# Patient Record
Sex: FEMALE | Race: WHITE
[De-identification: names, ages, dates, MRNs, and addresses within clinical notes are randomized per-mention and may not be internally consistent; named-entity substitution may affect disease eponyms.]

---

## 2018-10-19 ENCOUNTER — HOSPITAL ENCOUNTER (OUTPATIENT)
Dept: HOSPITAL 80 - CIMAGING | Age: 48
End: 2018-10-19
Attending: NURSE PRACTITIONER
Payer: COMMERCIAL

## 2018-10-19 DIAGNOSIS — R59.0: ICD-10-CM

## 2018-10-19 DIAGNOSIS — E04.2: Primary | ICD-10-CM

## 2019-01-01 ENCOUNTER — HOSPITAL ENCOUNTER (INPATIENT)
Dept: HOSPITAL 80 - FED | Age: 49
LOS: 2 days | Discharge: HOME | DRG: 343 | End: 2019-01-03
Attending: SURGERY | Admitting: SURGERY
Payer: SELF-PAY

## 2019-01-01 DIAGNOSIS — K35.20: Primary | ICD-10-CM

## 2019-01-01 DIAGNOSIS — E03.9: ICD-10-CM

## 2019-01-01 LAB — PLATELET # BLD: 266 10^3/UL (ref 150–400)

## 2019-01-01 PROCEDURE — G0378 HOSPITAL OBSERVATION PER HR: HCPCS

## 2019-01-01 NOTE — GHP
DATE OF ADMISSION:  01/01/2019



CHIEF COMPLAINT:  Acute appendicitis.



HISTORY OF PRESENT ILLNESS:  The patient is a 48-year-old woman who 1 week ago started having left up
per quadrant pain.  She thought she was having gas pain.  It would come and go.  She was at a yoga re
treat and again had the pain in the left upper quadrant; however, it migrated down to her pelvis area
, so she originally thought that she was having some issues with her reproductive system.  She then h
ad pain by her rectum.  When the pain woke her up with more of a right lower quadrant pain, she asked
 a nurse at the yoga retreat, and then she was brought to [facility].  CT scan shows inflamed appendix w
ith possible fecalith outside of the appendix.  She denies fevers.  She denies vomiting.  She does en
dorse anorexia.



PAST MEDICAL HISTORY:  Hypothyroidism.



PAST SURGICAL HISTORY:  Tubal ligation.



SOCIAL HISTORY:  She works as a psychotherapist.  She likes to do yoga and run.  She is a nonsmoker.



FAMILY HISTORY:  Noncontributory.



REVIEW OF SYSTEMS:  Per HPI.



PHYSICAL EXAMINATION:  VITAL SIGNS:  37, 82, 128/76, 18, 97% room air.  GENERAL:  Pleasant, well-groo
med, well-nourished woman sitting on gurney.  Appears nontoxic.  HEENT:  Normocephalic.  No gross hea
ring deficits.  Mucous membranes moist.  Pupils equal and round.  No scleral icterus.  LUNGS:  Clear 
to auscultation bilaterally.  No increased work of breathing.  CARDIAC:  Regular rate.  No peripheral
 edema.  ABDOMEN:  Bowel sounds are present.  She is tender to palpation in the left upper quadrant. 
 She has very minimal pain in the right lower quadrant or near her pelvis.  MUSCULOSKELETAL:  Normal 
nails.  NEURO:  Grossly intact.  PSYCH:  Mood and affect normal.



RESULTS REVIEWED:  Personally reviewed the results of her laboratory work, and she has ketones in her
 urine.  Her chemistry panel is within normal limits.  Beta HCG negative.  Her white blood cell count
 is 10.61.  I reviewed the results of her CT scan and see the concern of the extraluminal appendicoli
th.  There is also some lucent area in the uterus as well.



IMPRESSION AND PLAN:  The patient is a 48-year-old woman with possible perforated appendicitis.  Svetlana
rkable that her white count is not more elevated and that she does not have a lot of pain in the righ
t lower quadrant.  I will take her to the operating room for a laparoscopic appendectomy with possibl
e washout.  I discussed that if it is not perforated, she may be able to go home today; however, if i
t is perforated, she will require admission with additional IV antibiotics.  Risks and benefits were 
discussed.  She signed the informed consent.  Her reaction to penicillin as a child was that she turn
ed red.  We discussed the possible cross reactivity with ceftriaxone, and we will proceed with giving
 it while watching her.





Job #:  [id number]

## 2019-01-01 NOTE — EDPHY
HPI/HX/ROS/PE/MDM


Narrative: 


CHIEF COMPLAINT: Abdominal pain





HPI: 


The patient is a 49 y/o female with a history of a tubal ligation arriving via 

EMS complaining of a sharp, midline abdominal pain, onset at 01:30, 6 hours 

ago. Prior to developing the pain she had some gas pain. However, she woke up 

from her sleep with the sharp, diffuse abdominal pain. This pain feels similar 

to the pain she had while giving birth. While en route to the hospital she was 

given 150mcg IV Fentanyl which improved her pain from 10/10 to 4/10. She has 

had a decreased appetite associated with the pain. She denies urinary symptoms 

but states that the last time she urinated it was more concentrated and had an 

odd smell. No fever, headache, chest pain, shortness of breath, bowel complaints

, numbness, paresthesias. 





REVIEW OF SYSTEMS:


Aside from elements discussed in the HPI, a comprehensive 10-point review of 

systems was reviewed and is negative.





PMH: Tubal ligation, Hypothyroid





SOCIAL HISTORY: Lives in [place], employed as a sex therapist, 





PHYSICAL EXAM:


General: Patient is alert, in no acute distress.


ENT: Eyes are normal to inspection.  ENT inspection normal.


Neck: Normal inspection.  Full range of motion.


Respiratory: No respiratory distress.  Breath sounds normal bilaterally.


Cardiovascular: Regular rate and rhythm.  Strong peripheral pulses.  Normal cap 

refill.


Abdomen: Moderate suprapubic tenderness to palpation. There are no peritoneal 

signs. There are normal bowel sounds.


Back: Normal to inspection.  No tenderness to palpation.


Skin: Normal color.  No rash.  Warm and dry.


Extremities: Normal appearance.  Full range of motion.


Neuro: Oriented x3.  Normal motor function.  Normal sensory function.





ED Course: 





0710: I met EMS upon arrival.





0828: Reassessed patient and discussed laboratory findings. I have also 

discussed plan for abdominopelvic CT, which she is comfortable with. She is 

still in pain, 15mg IV Toradol and 1L IV NS administered.





0915: I spoke with Dr. Angulo, radiologist, who reports the patient has acute 

appendicitis. General surgery will be paged.





0920: Reassessed patient and discussed imaging findings. I have also discussed 

plan for surgery, which she is comfortable with.





0925: I consulted with Dr. Prince, general surgeon, who agrees to consult and 

admit this patient.








- Data Points


Imaging Results: 


 Imaging Impressions





Abdomen CT  01/01/19 08:29


Impression:


1. Appendicitis, possibly perforated, with regional inflammation. A small, 5 mm

, calculus (possibly appendicolith) resides inferiorly medial to the appendix. 

Trace fluid. No abscess.


2. Enlarged uterus containing numerous intramural leiomyomas.


 


Findings discussed with Emergency Department physician, Jose Xiao MD  

at  1/1/2019 9:20.


 


 











Imaging: Discussed imaging studies w/ On call Radiologist, I viewed and 

interpreted images myself


Laboratory Results: 


 Laboratory Results





 01/01/19 07:18 





 01/01/19 07:18 





 











  01/01/19 01/01/19 01/01/19





  07:58 07:18 07:18


 


WBC      





    


 


RBC      





    


 


Hgb      





    


 


Hct      





    


 


MCV      





    


 


MCH      





    


 


MCHC      





    


 


RDW      





    


 


Plt Count      





    


 


MPV      





    


 


Neut % (Auto)      





    


 


Lymph % (Auto)      





    


 


Mono % (Auto)      





    


 


Eos % (Auto)      





    


 


Baso % (Auto)      





    


 


Nucleat RBC Rel Count      





    


 


Absolute Neuts (auto)      





    


 


Absolute Lymphs (auto)      





    


 


Absolute Monos (auto)      





    


 


Absolute Eos (auto)      





    


 


Absolute Basos (auto)      





    


 


Absolute Nucleated RBC      





    


 


Immature Gran %      





    


 


Immature Gran #      





    


 


Sodium      136 mEq/L mEq/L





     (135-145) 


 


Potassium      3.9 mEq/L mEq/L





     (3.5-5.2) 


 


Chloride      101 mEq/L mEq/L





     () 


 


Carbon Dioxide      21 mEq/l L mEq/l





     (22-31) 


 


Anion Gap      14 mEq/L mEq/L





     (6-14) 


 


BUN      8 mg/dL mg/dL





     (7-23) 


 


Creatinine      0.7 mg/dL mg/dL





     (0.6-1.0) 


 


Estimated GFR      > 60 





    


 


Glucose      124 mg/dL H mg/dL





     () 


 


Calcium      9.5 mg/dL mg/dL





     (8.5-10.4) 


 


Beta HCG, Qual    NEGATIVE   





    


 


Urine Color  URVASHI     





    


 


Urine Appearance  MODERATELY TURBID     





    


 


Urine pH  5.0     





   (5.0-7.5)   


 


Ur Specific Gravity  1.025     





   (1.002-1.030)   


 


Urine Protein  NEGATIVE     





   (NEGATIVE)   


 


Urine Ketones  2+  H     





   (NEGATIVE)   


 


Urine Blood  NEGATIVE     





   (NEGATIVE)   


 


Urine Nitrate  NEGATIVE     





   (NEGATIVE)   


 


Urine Bilirubin  NEGATIVE     





   (NEGATIVE)   


 


Urine Urobilinogen  2.0 EU H EU    





   (0.2-1.0)   


 


Ur Leukocyte Esterase  NEGATIVE     





   (NEGATIVE)   


 


Urine Glucose  NEGATIVE     





   (NEGATIVE)   














  01/01/19





  07:18


 


WBC  10.61 10^3/uL H 10^3/uL





   (3.80-9.50) 


 


RBC  4.53 10^6/uL 10^6/uL





   (4.18-5.33) 


 


Hgb  13.3 g/dL g/dL





   (12.6-16.3) 


 


Hct  39.6 % %





   (38.0-47.0) 


 


MCV  87.4 fL fL





   (81.5-99.8) 


 


MCH  29.4 pg pg





   (27.9-34.1) 


 


MCHC  33.6 g/dL g/dL





   (32.4-36.7) 


 


RDW  13.3 % %





   (11.5-15.2) 


 


Plt Count  266 10^3/uL 10^3/uL





   (150-400) 


 


MPV  10.5 fL fL





   (8.7-11.7) 


 


Neut % (Auto)  85.8 % H %





   (39.3-74.2) 


 


Lymph % (Auto)  8.4 % L %





   (15.0-45.0) 


 


Mono % (Auto)  4.5 % %





   (4.5-13.0) 


 


Eos % (Auto)  0.2 % L %





   (0.6-7.6) 


 


Baso % (Auto)  0.6 % %





   (0.3-1.7) 


 


Nucleat RBC Rel Count  0.0 % %





   (0.0-0.2) 


 


Absolute Neuts (auto)  9.11 10^3/uL H 10^3/uL





   (1.70-6.50) 


 


Absolute Lymphs (auto)  0.89 10^3/uL L 10^3/uL





   (1.00-3.00) 


 


Absolute Monos (auto)  0.48 10^3/uL 10^3/uL





   (0.30-0.80) 


 


Absolute Eos (auto)  0.02 10^3/uL L 10^3/uL





   (0.03-0.40) 


 


Absolute Basos (auto)  0.06 10^3/uL 10^3/uL





   (0.02-0.10) 


 


Absolute Nucleated RBC  0.00 10^3/uL 10^3/uL





   (0-0.01) 


 


Immature Gran %  0.5 % %





   (0.0-1.1) 


 


Immature Gran #  0.05 10^3/uL 10^3/uL





   (0.00-0.10) 


 


Sodium  





  


 


Potassium  





  


 


Chloride  





  


 


Carbon Dioxide  





  


 


Anion Gap  





  


 


BUN  





  


 


Creatinine  





  


 


Estimated GFR  





  


 


Glucose  





  


 


Calcium  





  


 


Beta HCG, Qual  





  


 


Urine Color  





  


 


Urine Appearance  





  


 


Urine pH  





  


 


Ur Specific Gravity  





  


 


Urine Protein  





  


 


Urine Ketones  





  


 


Urine Blood  





  


 


Urine Nitrate  





  


 


Urine Bilirubin  





  


 


Urine Urobilinogen  





  


 


Ur Leukocyte Esterase  





  


 


Urine Glucose  





  











Medications Given: 


 





Potassium Chloride/Dextrose/Sod Cl (D5w 1/2 Ns W/ 20 Kcl/L)  1,000 mls @ 75 mls/

hr IV CONT DENISE


   Stop: 06/30/19 09:44


   Last Admin: 01/01/19 11:31 Dose:  1,000 mls





Discontinued Medications





Sodium Chloride (Ns)  1,000 mls @ 0 mls/hr IV EDNOW ONE; Wide Open


   PRN Reason: Protocol


   Stop: 01/01/19 07:10


   Last Admin: 01/01/19 07:17 Dose:  1,000 mls


Ceftriaxone Sodium 2 gm/ (Sodium Chloride)  50 mls @ 100 mls/hr IV EDNOW ONE


   PRN Reason: Protocol


   Stop: 01/01/19 10:13


   Last Admin: 01/01/19 12:06 Dose:  50 mls


Ketorolac Tromethamine (Toradol)  15 mg IVP EDNOW ONE


   Stop: 01/01/19 08:37


   Last Admin: 01/01/19 08:39 Dose:  15 mg








General


Time Seen by Provider: 01/01/19 07:09


Initial Vital Signs: 


 Initial Vital Signs











Temperature (C)  37.0 C   01/01/19 07:10


 


Heart Rate  82   01/01/19 07:10


 


Respiratory Rate  18   01/01/19 07:10


 


Blood Pressure  128/76 H  01/01/19 07:10


 


O2 Sat (%)  97   01/01/19 07:10








 











O2 Delivery Mode               Room Air














Allergies/Adverse Reactions: 


 





Penicillins Allergy (Mild, Verified 01/01/19 11:57)


 Other-Enter Comments








Home Medications: 














 Medication  Instructions  Recorded


 


Thyroid [Kismet Thyroid 60 MG (*)] 30 mg PO DAILY 01/01/19














Departure





- Departure


Disposition: [facility]


Clinical Impression: 


Acute appendicitis


Qualifiers:


 Acute appendicitis type: unspecified acute appendicitis type Qualified Code(s)

: K35.80 - Unspecified acute appendicitis





Condition: Fair


Report Scribed for: Jose Xiao


Report Scribed by: Ban Sarah


Date of Report: 01/01/19


Time of Report: 07:10


Physician Review and Approval Statement: Portions of this note were transcribed 

by an ED scribe.  I personally performed the history, physical exam, and 

medical decision making; and confirm the accuracy of the information in the 

transcribed note.

## 2019-01-01 NOTE — GOP
DATE OF OPERATION:  01/01/2019



SURGEON:  Jazmine Prince MD



ANESTHESIA:  General.



ANESTHESIOLOGIST:  Virgilio Pinedo MD.



PREOPERATIVE DIAGNOSIS:  Perforated appendicitis.



POSTOPERATIVE DIAGNOSIS:  Perforated appendicitis.



PROCEDURE PERFORMED:  Laparoscopic appendectomy with washout and drain 
placement.



FINDINGS:  perforated appendix with fluid in gutter and pelvis



SPECIMENS:  Appendix and fluid for microbiology.



ESTIMATED BLOOD LOSS:  5 cc.



INDICATIONS:  The patient is a 48-year-old woman who presented with left upper 
quadrant pain, pelvic pain and right lower quadrant pain.  CT scan showed 
possible ruptured appendicitis.



DESCRIPTION OF PROCEDURE:  Patient was brought into the operating room, placed 
supine on the table.  General anesthesia was administered.  Her abdomen was 
prepped and draped in the usual sterile fashion.  I infiltrated all sites with 
0.5% Marcaine prior to making incisions.  I made the incision at her umbilicus.
  I elevated it.  I inserted the Veress needle and passed the hanging drop 
test.  Her abdomen insufflated easily to a pressure of 15 mmHg.  I placed a 5 
mm trocar with a camera at this site.  There were no injuries from Veress 
needle placement.  Under direct vision, I placed a 5 mm suprapubic trocar and a 
10 mm trocar in the left lower quadrant.  I explored her abdomen.  There was 
purulent fluid along the right paracolic gutter and in the pelvis.  She had a 
generous-sized uterus with fibroids on it.  I performed suction and submitted a 
sample for microbiology.  Next, I dissected the appendix away from the cecum.  
It was in a retrocecal position.  There was an obvious rupture point.  I 
transected the mesoappendix with the Harmonic Scalpel.  I divided the base with 
an Endo TAYLOR 45 white load.  The appendix was placed in an EndoCatch bag and 
retrieved via the 10 mm trocar.  Suction irrigation was performed.  I placed a 
15 round silicone drain via the pubic port into her pelvis and up along the 
right paracolic gutter.  It was sutured in place with 3-0 nylon.  All ports 
removed under direct vision, the abdomen allowed to desufflate.  The fascia at 
the 10 mm trocar site was closed with 0 Vicryl, skin closed with 4-0 Monocryl.  
Dermabond applied.  Drain sponge applied.  She was awakened in the operating 
room, extubated, transferred to PACU in stable condition.





Job #:  [id number]

MTDD

## 2019-01-01 NOTE — ASMTCMCOM
CM Note

 

CM Note                       

Notes:

Reviewed chart. Pt presented to the Emergency Department with left and right upper quadrant 

pain. History includes hypothyroidism and a tubal ligation. 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 

[place]; she works as a Psychotherapist. Pt admitted for acute appendicitis. Discharge needs 

remain unclear at this time. Anticipate pt will likely discharge home independently when medically 

stable. CM will continue to follow.



Discharge Plan: Likely independent, CM to follow for any potential needs

 

Date Signed:  01/01/2019 12:47 PM

Electronically Signed By:Preethi Wells RN

## 2019-01-01 NOTE — PDANEPAE
ANE History of Present Illness





toro FRANCISCO Past Medical History





- Cardiovascular History


Hx Hypertension: No


Hx Arrhythmias: No


Hx Chest Pain: No


Hx Coronary Artery / Peripheral Vascular Disease: No


Hx CHF / Valvular Disease: No


Hx Palpitations: No





- Pulmonary History


Hx COPD: No


Hx Asthma/Reactive Airway Disease: No


Hx Recent Upper Respiratory Infection: No


Hx Oxygen in Use at Home: No


Hx Sleep Apnea: No


Sleep Apnea Screening Result - Last Documented: Negative





- Endocrine History


Hx Diabetes: No


Obesity: no





- Chronic Pain History


Chronic Pain: No





ANE Review of Systems


Review of systems is: negative


Review of Systems: 








- Exercise capacity


Exercise capacity: >=4 METS





ANE Patient History





- Allergies


Allergies/Adverse Reactions: 








Penicillins Allergy (Mild, Verified 01/01/19 11:57)


 Other-Enter Comments








- Home Medications


Home Medications: 








Thyroid [Colrain Thyroid 60 MG (*)] 30 mg PO DAILY 01/01/19 [Last Taken 12/31/18]








- NPO status


NPO Since - Liquids (Date): 12/31/18


NPO Since - Liquids (Time): 22:00


NPO Since - Solids (Date): 12/31/18


NPO Since - Solids (Time): 22:00





- Smoking Hx


Smoking Status: Never smoked





ANE Labs/Vital Signs





- Labs


Result Diagrams: 


 01/01/19 07:18





 01/01/19 07:18





- Vital Signs


Blood Pressure: 115/64


Heart Rate: 83


Respiratory Rate: 16


O2 Sat (%): 96


Height: 165.1 cm


Weight: 68 kg





ANE Physical Exam





- Airway


Neck exam: FROM


Mallampati Score: Class 1


Mouth exam: normal dental/mouth exam





- Pulmonary


Pulmonary: no respiratory distress





- Cardiovascular


Cardiovascular: regular rate and rhythym





- ASA Status


ASA Status: II, E





ANE Anesthesia Plan


Anesthesia Plan: general endotracheal anesthesia

## 2019-01-01 NOTE — POSTOPPROG
Post Op Note


Date of Operation: 01/01/19


Surgeon: Jazmine Prince


Anesthesiologist: noah


Anesthesia: GET(General Endotracheal)


Pre-op Diagnosis: perf appy


Post-op Diagnosis: perf appy


Indication: 49 yo with perf appy


Procedure: lap appy with washout


Findings: perforated appendix.  Large uterus with fibroids


Inf/Abcess present in the surg proc area at time of surgery?: Yes


Depth: Organ Space


EBL: Minimal


Drains: Carloz Phillips


Specimen(s): 





micro and path

## 2019-01-02 LAB — PLATELET # BLD: 241 10^3/UL (ref 150–400)

## 2019-01-02 PROCEDURE — 0DTJ4ZZ RESECTION OF APPENDIX, PERCUTANEOUS ENDOSCOPIC APPROACH: ICD-10-PCS | Performed by: SURGERY

## 2019-01-02 RX ADMIN — ENOXAPARIN SODIUM SCH: 100 INJECTION SUBCUTANEOUS at 09:45

## 2019-01-02 RX ADMIN — TAZOBACTAM SODIUM AND PIPERACILLIN SODIUM SCH MLS: 500; 4 INJECTION, SOLUTION INTRAVENOUS at 16:44

## 2019-01-02 RX ADMIN — TAZOBACTAM SODIUM AND PIPERACILLIN SODIUM SCH MLS: 500; 4 INJECTION, SOLUTION INTRAVENOUS at 21:50

## 2019-01-02 RX ADMIN — TAZOBACTAM SODIUM AND PIPERACILLIN SODIUM SCH MLS: 500; 4 INJECTION, SOLUTION INTRAVENOUS at 09:39

## 2019-01-02 NOTE — PDMN
Medical Necessity


Medical necessity: MCG: S185  appendicitis with abscess or peritonitis by Lap  

2 days:  OP: Lap appy with washout - perf appy with abscess  MILES drain placed

## 2019-01-02 NOTE — ASMTCMCOM
CM Note

 

CM Note                       

Notes:

Chart reviewed. Patient post op day 1 s/p lap appendectomy. On antibiotics. Diet progressing. No 

needs anticipated. CM available should needs arise.



Plan: Likely to dc to home independent when medically cleared for discharge.

 

Date Signed:  01/02/2019 12:38 PM

Electronically Signed By:Nydia Yee RN

## 2019-01-02 NOTE — SOAPPROG
SOAP Progress Note


Assessment/Plan: 


Assessment:


POD # 2 s/p lap appy for perforated appendix


Micro with GNR and GPC


Home with Augmentin


Has return of bowel function - advance diet








Can discharge home





S:Doing well


MILES drain with serosanguinous fluid


Abdomen soft and non tender


BS present


Lying in bed, pleasant and cheerful























Plan:





01/02/19 09:39





01/02/19 09:39





01/03/19 07:49





Objective: 





 Vital Signs











Temp Pulse Resp BP Pulse Ox


 


 37.2 C   85   16   106/73   97 


 


 01/02/19 08:14  01/02/19 08:14  01/02/19 08:14  01/02/19 08:14  01/02/19 08:14














ICD10 Worksheet


Patient Problems: 


 Problems











Problem Status Onset


 


Acute appendicitis Acute

## 2019-01-03 VITALS — SYSTOLIC BLOOD PRESSURE: 126 MMHG | DIASTOLIC BLOOD PRESSURE: 72 MMHG

## 2019-01-03 RX ADMIN — TAZOBACTAM SODIUM AND PIPERACILLIN SODIUM SCH MLS: 500; 4 INJECTION, SOLUTION INTRAVENOUS at 03:49

## 2019-01-03 RX ADMIN — ENOXAPARIN SODIUM SCH: 100 INJECTION SUBCUTANEOUS at 11:01

## 2019-01-03 RX ADMIN — TAZOBACTAM SODIUM AND PIPERACILLIN SODIUM SCH: 500; 4 INJECTION, SOLUTION INTRAVENOUS at 11:02

## 2019-01-03 NOTE — ASDISCHSUM
----------------------------------------------

Discharge Information

----------------------------------------------

Plan Status:Home with No Needs                       Medically Cleared to Leave:01/02/2019

Discharge Date:01/02/2019                            CM D/C Disposition:Home, Routine, Self-Care

ADT D/C Disposition:Home, Routine, Self-Care         Projected Discharge Date:01/02/2019

Transportation at D/C:                               Discharge Delay Reason:

Follow-Up Date:01/02/2019                            Discharge Slot:

Final Diagnosis:

----------------------------------------------

Placement Information

----------------------------------------------

----------------------------------------------

Patient Contact Information

----------------------------------------------

Contact Name:ELMO                           Relationship:Friend

Address:[address]                      Home [phone]

                                                     Work [phone]

City:[facility] [place] Phone:

[facility]/Zip Code:[place] [address]                              Email:

----------------------------------------------

Financial Information

----------------------------------------------

Financial Class:Self-Pay

Primary Plan Desc:SELF PAY                           Primary Plan Number:

Secondary Plan Desc:                                 Secondary Plan Number:

 

 

----------------------------------------------

Assessment Information

----------------------------------------------

----------------------------------------------

LACE

----------------------------------------------

LACE

 

Length of stay for            Answers:  1 day                                 

current admission                                                             

Acuity / Level of             Answers:  No                                    

Care: Did the patient                                                         

have an inpatient                                                             

admission?                                                                    

Comorbidities - select        Answers:  Other                         Notes:  Tubal 

all that apply                                                                ligation, Hypothyroidis
m

# of Emergency department     Answers:  1-2                                   

visits in the last 6                                                          

months                                                                        

Score: 3

 

Date Signed:  01/03/2019 10:14 AM

Electronically Signed By:Nydia Yee RN

 

 

----------------------------------------------

[facility] CM Progress Note

----------------------------------------------

CM Note

 

CM Note                       

Notes:

Reviewed chart. Pt presented to the Emergency Department with left and right upper quadrant 

pain. History includes hypothyroidism and a tubal ligation. 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 

[place]; she works as a Psychotherapist. Pt admitted for acute appendicitis. Discharge needs 

remain unclear at this time. Anticipate pt will likely discharge home independently when medically 

stable. CM will continue to follow.



Discharge Plan: Likely independent, CM to follow for any potential needs

 

Date Signed:  01/01/2019 12:47 PM

Electronically Signed By:Preethi Wells RN

 

 

----------------------------------------------

[facility] CM Progress Note

----------------------------------------------

CM Note

 

CM Note                       

Notes:

Chart reviewed. Patient post op day 1 s/p lap appendectomy. On antibiotics. Diet progressing. No 

needs anticipated. CM available should needs arise.



Plan: Likely to dc to home independent when medically cleared for discharge.

 

Date Signed:  01/02/2019 12:38 PM

Electronically Signed By:Nydia Yee RN

 

 

----------------------------------------------

Intervention Information

----------------------------------------------

## 2019-01-03 NOTE — ASMTLACE
LACE

 

Length of stay for            Answers:  1 day                                 

current admission                                                             

Acuity / Level of             Answers:  No                                    

Care: Did the patient                                                         

have an inpatient                                                             

admission?                                                                    

Comorbidities - select        Answers:  Other                         Notes:  Tubal 

all that apply                                                                ligation, Hypothyroidis
m

# of Emergency department     Answers:  1-2                                   

visits in the last 6                                                          

months                                                                        

Score: 3

 

Date Signed:  01/03/2019 10:14 AM

Electronically Signed By:Nydia Yee RN